# Patient Record
(demographics unavailable — no encounter records)

---

## 2024-12-05 NOTE — HISTORY OF PRESENT ILLNESS
[de-identified] : Eats well - varied diet. 16oz of whole milk or 1% milk. [de-identified] : Lives with parents, brother and sister. No guns in the home. [FreeTextEntry1] :  Has R eye tearing and discharge. No redness. Previously had antibiotic drops.  Last time was 6 months at [facility].